# Patient Record
Sex: FEMALE | Race: WHITE | NOT HISPANIC OR LATINO | Employment: UNEMPLOYED | URBAN - NONMETROPOLITAN AREA
[De-identification: names, ages, dates, MRNs, and addresses within clinical notes are randomized per-mention and may not be internally consistent; named-entity substitution may affect disease eponyms.]

---

## 2017-02-16 ENCOUNTER — TRANSCRIBE ORDERS (OUTPATIENT)
Dept: ADMINISTRATIVE | Facility: HOSPITAL | Age: 52
End: 2017-02-16

## 2017-02-16 DIAGNOSIS — J01.91 ACUTE RECURRENT SINUSITIS, UNSPECIFIED LOCATION: Primary | ICD-10-CM

## 2017-03-22 ENCOUNTER — APPOINTMENT (OUTPATIENT)
Dept: CT IMAGING | Facility: HOSPITAL | Age: 52
End: 2017-03-22
Attending: OTOLARYNGOLOGY

## 2017-03-23 ENCOUNTER — OFFICE VISIT (OUTPATIENT)
Dept: PULMONOLOGY | Facility: CLINIC | Age: 52
End: 2017-03-23

## 2017-03-23 VITALS
DIASTOLIC BLOOD PRESSURE: 82 MMHG | SYSTOLIC BLOOD PRESSURE: 118 MMHG | HEART RATE: 96 BPM | OXYGEN SATURATION: 92 % | BODY MASS INDEX: 31.41 KG/M2 | WEIGHT: 184 LBS | TEMPERATURE: 98.2 F | HEIGHT: 64 IN

## 2017-03-23 DIAGNOSIS — J41.0 SIMPLE CHRONIC BRONCHITIS (HCC): Primary | ICD-10-CM

## 2017-03-23 DIAGNOSIS — J30.9 CHRONIC ALLERGIC RHINITIS: ICD-10-CM

## 2017-03-23 DIAGNOSIS — E66.9 OBESITY (BMI 30-39.9): ICD-10-CM

## 2017-03-23 PROCEDURE — 99204 OFFICE O/P NEW MOD 45 MIN: CPT | Performed by: INTERNAL MEDICINE

## 2017-03-23 RX ORDER — GUAIFENESIN AND DEXTROMETHORPHAN HYDROBROMIDE 600; 30 MG/1; MG/1
TABLET, EXTENDED RELEASE ORAL
Refills: 0 | COMMUNITY
Start: 2017-02-09 | End: 2017-09-27 | Stop reason: ALTCHOICE

## 2017-03-23 RX ORDER — LORATADINE 10 MG/1
10 TABLET ORAL DAILY
Refills: 2 | COMMUNITY
Start: 2017-03-07

## 2017-03-23 RX ORDER — HYDROCHLOROTHIAZIDE 12.5 MG/1
12.5 TABLET ORAL EVERY OTHER DAY
Refills: 1 | COMMUNITY
Start: 2017-03-07

## 2017-03-23 RX ORDER — ERGOCALCIFEROL 1.25 MG/1
50000 CAPSULE ORAL
Refills: 3 | COMMUNITY
Start: 2017-03-07

## 2017-03-23 RX ORDER — PANTOPRAZOLE SODIUM 40 MG/1
40 TABLET, DELAYED RELEASE ORAL DAILY
Refills: 1 | COMMUNITY
Start: 2017-03-07

## 2017-03-23 RX ORDER — DOXYCYCLINE 100 MG/1
CAPSULE ORAL
Refills: 0 | COMMUNITY
Start: 2017-03-17 | End: 2017-09-27 | Stop reason: ALTCHOICE

## 2017-03-23 RX ORDER — LISINOPRIL 10 MG/1
10 TABLET ORAL DAILY
Refills: 1 | COMMUNITY
Start: 2017-03-07

## 2017-03-23 RX ORDER — LEVOFLOXACIN 750 MG/1
TABLET ORAL
Refills: 0 | COMMUNITY
Start: 2017-02-09 | End: 2017-09-27 | Stop reason: ALTCHOICE

## 2017-03-23 RX ORDER — OSELTAMIVIR PHOSPHATE 75 MG/1
CAPSULE ORAL
Refills: 0 | COMMUNITY
Start: 2017-03-17 | End: 2017-09-27 | Stop reason: ALTCHOICE

## 2017-03-23 RX ORDER — FLUTICASONE PROPIONATE 50 MCG
2 SPRAY, SUSPENSION (ML) NASAL AS NEEDED
Refills: 2 | COMMUNITY
Start: 2017-02-08

## 2017-03-23 RX ORDER — GABAPENTIN 300 MG/1
CAPSULE ORAL
Refills: 0 | COMMUNITY
Start: 2017-03-08 | End: 2017-09-27 | Stop reason: ALTCHOICE

## 2017-03-23 RX ORDER — FLUCONAZOLE 150 MG/1
TABLET ORAL
Refills: 2 | COMMUNITY
Start: 2016-12-20 | End: 2017-09-27 | Stop reason: ALTCHOICE

## 2017-03-23 RX ORDER — IBUPROFEN 800 MG/1
TABLET ORAL
Refills: 1 | Status: ON HOLD | COMMUNITY
Start: 2017-03-07 | End: 2018-08-01

## 2017-03-23 RX ORDER — RALOXIFENE HYDROCHLORIDE 60 MG/1
60 TABLET, FILM COATED ORAL DAILY
Refills: 1 | COMMUNITY
Start: 2017-03-07

## 2017-03-23 NOTE — PROGRESS NOTES
Subjective   Mei Stinson is a 51 y.o. female who is being seen for Asthma    History of Present Illness   This is a 51-year-old female who presents today for evaluation of ongoing shortness of breath.  Patient tells me that she has history of smoking and had cough for life which she had to be due to smoking.  She did quit smoking roughly a year ago but noticed that her cough is not going away.  She is also having progressively increasing shortness of breath.  She uses albuterol inhaler on a when necessary basis.    Patient also tells us that she has chronic allergy for which she is on medication.  Lately her allergy symptoms has increased significantly and now she is going to an allergist, waiting to have an allergy test.    Past Medical History:   Diagnosis Date   • Chronic bronchitis    • COPD (chronic obstructive pulmonary disease)    • Diabetes mellitus    • Fibromyalgia    • Hypertension      Past Surgical History:   Procedure Laterality Date   • BLADDER SURGERY     • CHOLECYSTECTOMY     • FOOT SURGERY     • HYSTERECTOMY     • TONSILLECTOMY     • TUBAL ABDOMINAL LIGATION       Family History   Problem Relation Age of Onset   • COPD Mother    • Emphysema Mother    • Heart failure Mother    • Alzheimer's disease Father    • Cancer Father    • Heart disease Sister       reports that she has quit smoking. Her smoking use included Cigarettes. She has never used smokeless tobacco. She reports that she does not drink alcohol or use illicit drugs.  Allergies   Allergen Reactions   • Sulfa Antibiotics            The following portions of the patient's history were reviewed and updated as appropriate: allergies, current medications, past family history, past medical history, past social history, past surgical history and problem list.    Review of Systems   Constitutional: Positive for fatigue. Negative for appetite change, chills, diaphoresis and unexpected weight change.   HENT: Positive for congestion, postnasal  "drip and sinus pressure. Negative for sore throat, trouble swallowing and voice change.    Eyes: Negative for visual disturbance.   Respiratory: Positive for cough, chest tightness, shortness of breath and wheezing. Negative for apnea and choking.    Cardiovascular: Negative for chest pain, palpitations and leg swelling.   Gastrointestinal: Negative for abdominal pain, constipation, diarrhea, nausea and vomiting.   Endocrine: Negative for cold intolerance, heat intolerance, polydipsia, polyphagia and polyuria.   Genitourinary: Negative for difficulty urinating and dysuria.   Musculoskeletal: Negative for gait problem.   Skin: Negative for rash and wound.   Allergic/Immunologic: Positive for environmental allergies.   Neurological: Negative for syncope and light-headedness.   Hematological: Negative for adenopathy.   Psychiatric/Behavioral: Positive for confusion. Negative for agitation and behavioral problems.   All other systems reviewed and are negative.      Objective   /82 (BP Location: Left arm, Patient Position: Sitting, Cuff Size: Adult)  Pulse 96  Temp 98.2 °F (36.8 °C) (Oral)   Ht 64\" (162.6 cm)  Wt 184 lb (83.5 kg)  SpO2 92%  BMI 31.58 kg/m2  Physical Exam   Constitutional: She is oriented to person, place, and time.   HENT:   Head: Normocephalic and atraumatic.   Nose: Mucosal edema present.   Eyes: EOM are normal. Pupils are equal, round, and reactive to light.   Neck: Neck supple.   Cardiovascular: Normal rate, regular rhythm and normal heart sounds.    Pulmonary/Chest: She has rhonchi.   Vesicular breath sound bilaterally with prolonged expiratory phase   Abdominal: Soft. Bowel sounds are normal.   Musculoskeletal: Normal range of motion. She exhibits no deformity.   Neurological: She is alert and oriented to person, place, and time.   Skin: Skin is warm and dry.   Psychiatric: She has a normal mood and affect. Her behavior is normal.   Nursing note and vitals " reviewed.        Radiology:  None available for my review today    Lab Results:  CBC  No results found for: WBC, RBC, HGB, HCT, MCV, MCH, MCHC, RDW, PLT, NEUTRORELPCT, LYMPHORELPCT, MONORELPCT, EOSRELPCT, EOSRELPCT, BASORELPCT, IMMATURECE, NEUTROABS, LYMPHSABS, MONOSABS, EOSABS, BASOSABS, IMMGRANABS, NRBC, LABHEMA    CMP  No results found for: NA, K, CL, CO2, GLUCOSE, BUN, CREATININE, CALCIUM, AST, ALKPHOS, BILITOT, ALT, LABIL2, LABOSMO, BCR, ANIONGAP, ALBUMIN, PROTEINTOT, EGFRCLEARA     Assessment      ICD-10-CM ICD-9-CM   1. Simple chronic bronchitis J41.0 491.0   2. Chronic allergic rhinitis J30.9 477.9   3. Obesity (BMI 30-39.9) E66.9 278.00                DISCUSSION:  This patient has symptoms consistent with a combination of allergic rhinitis and postnasal drip superimposed on COPD.    We are sending her for a pulmonary function test and a chest x-ray.  I like to reevaluate her again after that.  In the meantime I asked her to continue to follow with her allergist.    Plan    Orders Placed This Encounter   Procedures   • XR Chest PA & Lateral   • Pulmonary Function Test     No orders of the defined types were placed in this encounter.                 Adelso Marie MD, FCCP, FAASM  Pulmonary, Critical Care, and Sleep Medicine

## 2017-03-29 ENCOUNTER — HOSPITAL ENCOUNTER (OUTPATIENT)
Dept: CT IMAGING | Facility: HOSPITAL | Age: 52
Discharge: HOME | End: 2017-03-29
Attending: OTOLARYNGOLOGY | Admitting: OTOLARYNGOLOGY

## 2017-03-29 DIAGNOSIS — J01.91 ACUTE RECURRENT SINUSITIS, UNSPECIFIED LOCATION: ICD-10-CM

## 2017-03-29 PROCEDURE — 70486 CT MAXILLOFACIAL W/O DYE: CPT

## 2017-03-29 PROCEDURE — 70486 CT MAXILLOFACIAL W/O DYE: CPT | Performed by: RADIOLOGY

## 2017-05-04 ENCOUNTER — HOSPITAL ENCOUNTER (OUTPATIENT)
Dept: RESPIRATORY THERAPY | Facility: HOSPITAL | Age: 52
Discharge: HOME | End: 2017-05-04
Attending: INTERNAL MEDICINE | Admitting: INTERNAL MEDICINE

## 2017-05-04 ENCOUNTER — HOSPITAL ENCOUNTER (OUTPATIENT)
Dept: GENERAL RADIOLOGY | Facility: HOSPITAL | Age: 52
Discharge: HOME | End: 2017-05-04
Attending: INTERNAL MEDICINE

## 2017-05-04 DIAGNOSIS — J30.9 CHRONIC ALLERGIC RHINITIS: ICD-10-CM

## 2017-05-04 DIAGNOSIS — E66.9 OBESITY (BMI 30-39.9): ICD-10-CM

## 2017-05-04 DIAGNOSIS — J41.0 SIMPLE CHRONIC BRONCHITIS (HCC): ICD-10-CM

## 2017-05-04 PROCEDURE — 94060 EVALUATION OF WHEEZING: CPT

## 2017-05-04 PROCEDURE — 94727 GAS DIL/WSHOT DETER LNG VOL: CPT

## 2017-05-04 PROCEDURE — 71020 HC CHEST PA AND LATERAL: CPT

## 2017-05-04 PROCEDURE — 94729 DIFFUSING CAPACITY: CPT

## 2017-05-04 PROCEDURE — 94727 GAS DIL/WSHOT DETER LNG VOL: CPT | Performed by: INTERNAL MEDICINE

## 2017-05-04 PROCEDURE — 71020 XR CHEST PA AND LATERAL: CPT | Performed by: RADIOLOGY

## 2017-05-04 PROCEDURE — 94060 EVALUATION OF WHEEZING: CPT | Performed by: INTERNAL MEDICINE

## 2017-05-04 PROCEDURE — 94729 DIFFUSING CAPACITY: CPT | Performed by: INTERNAL MEDICINE

## 2017-06-07 ENCOUNTER — PREP FOR SURGERY (OUTPATIENT)
Dept: OTHER | Facility: HOSPITAL | Age: 52
End: 2017-06-07

## 2017-06-07 ENCOUNTER — DOCUMENTATION (OUTPATIENT)
Dept: OTOLARYNGOLOGY | Facility: HOSPITAL | Age: 52
End: 2017-06-07

## 2017-06-07 ENCOUNTER — HOSPITAL ENCOUNTER (OUTPATIENT)
Facility: HOSPITAL | Age: 52
Setting detail: HOSPITAL OUTPATIENT SURGERY
End: 2017-06-07
Attending: OTOLARYNGOLOGY | Admitting: OTOLARYNGOLOGY

## 2017-06-07 DIAGNOSIS — J30.1 ALLERGIC RHINITIS DUE TO POLLEN, UNSPECIFIED RHINITIS SEASONALITY: ICD-10-CM

## 2017-06-07 DIAGNOSIS — J32.0 CHRONIC MAXILLARY SINUSITIS: Primary | ICD-10-CM

## 2017-06-07 DIAGNOSIS — J34.2 NASAL SEPTAL DEVIATION: ICD-10-CM

## 2017-06-07 NOTE — H&P
Chief complaint: AST and CT results  HPI: AST-  (+) to all  CT- L & R maxillary sinus diease, septal deviation      Office Notes from 2/15/17 visits:  Sinus pain and pressure; headache in eye area; hearing loss; congestion; runny nose; > 6 rounds of ATB in 12 months for sinusitis; green discharge, facial pain, confusion, cherry tinnitus    Review of Systems:    negative    History  Past Medical History:   Diagnosis Date   • Chronic bronchitis    • COPD (chronic obstructive pulmonary disease)    • Diabetes mellitus    • Fibromyalgia    • Hypertension      Past Surgical History:   Procedure Laterality Date   • BLADDER SURGERY     • CHOLECYSTECTOMY     • FOOT SURGERY     • HYSTERECTOMY     • TONSILLECTOMY     • TUBAL ABDOMINAL LIGATION       Family History   Problem Relation Age of Onset   • COPD Mother    • Emphysema Mother    • Heart failure Mother    • Alzheimer's disease Father    • Cancer Father    • Heart disease Sister      Social History   Substance Use Topics   • Smoking status: Former Smoker     Types: Cigarettes   • Smokeless tobacco: Never Used   • Alcohol use No       (Not in a hospital admission)  Allergies:  Sulfa antibiotics    Objective     Vital Signs  Wt 190  Pulse 84  B/P 110/74    Physical Exam:      General Appearance:    Alert, cooperative, in no acute distress   Head:    Normocephalic, without obvious abnormality, atraumatic   Eyes:            Lids and lashes normal, conjunctivae and sclerae normal, no   icterus, no pallor, corneas clear, PERRLA   Ears:    Ears appear intact with no abnormalities noted   Nose:     Throat:   Nasal septum- deviated to the Lt; Turbinates- hypertrophied; Mucosa- normal    No oral lesions, no thrush, oral mucosa moist   Neck:   No adenopathy, supple, trachea midline, no thyromegaly, no   carotid bruit, no JVD    Thyroid- normal    Salivary Glands- normal       Lungs:     Clear to auscultation,respirations regular, even and                  unlabored    Heart:    Regular  rhythm and normal rate, normal S1 and S2, no            murmur, no gallop, no rub, no click                                                                  Assessment  Chronic Lt and Rt maxillary sinusitis  AR- due to pollen  Septal deviation    Plan    Septoplasty, Elbert Endo MMA's, Elbert ethmoids, Elbert avis bullosa           Alexander Whitfield MD  06/07/17  11:59 AM

## 2017-06-12 ENCOUNTER — APPOINTMENT (OUTPATIENT)
Dept: PREADMISSION TESTING | Facility: HOSPITAL | Age: 52
End: 2017-06-12

## 2017-09-27 ENCOUNTER — OFFICE VISIT (OUTPATIENT)
Dept: PULMONOLOGY | Facility: CLINIC | Age: 52
End: 2017-09-27

## 2017-09-27 VITALS
DIASTOLIC BLOOD PRESSURE: 86 MMHG | HEART RATE: 97 BPM | HEIGHT: 65 IN | WEIGHT: 198 LBS | BODY MASS INDEX: 32.99 KG/M2 | SYSTOLIC BLOOD PRESSURE: 156 MMHG | OXYGEN SATURATION: 95 % | TEMPERATURE: 98.2 F

## 2017-09-27 DIAGNOSIS — E66.9 OBESITY (BMI 30.0-34.9): ICD-10-CM

## 2017-09-27 DIAGNOSIS — J41.0 SIMPLE CHRONIC BRONCHITIS (HCC): Primary | ICD-10-CM

## 2017-09-27 PROCEDURE — 99214 OFFICE O/P EST MOD 30 MIN: CPT | Performed by: INTERNAL MEDICINE

## 2017-09-27 RX ORDER — LORATADINE, PSEUDOEPHEDRINE SULFATE 5; 120 MG/1; MG/1
TABLET, FILM COATED, EXTENDED RELEASE ORAL
Refills: 2 | COMMUNITY
Start: 2017-07-20 | End: 2017-09-27 | Stop reason: ALTCHOICE

## 2017-09-27 RX ORDER — CYCLOBENZAPRINE HCL 10 MG
10 TABLET ORAL 2 TIMES DAILY PRN
COMMUNITY
Start: 2017-09-13

## 2017-09-27 RX ORDER — INDOMETHACIN 50 MG/1
50 CAPSULE ORAL EVERY OTHER DAY
COMMUNITY
Start: 2017-09-01

## 2017-09-27 RX ORDER — LEVOFLOXACIN 500 MG/1
TABLET, FILM COATED ORAL
COMMUNITY
Start: 2017-08-17 | End: 2017-09-27 | Stop reason: ALTCHOICE

## 2017-09-27 RX ORDER — ALBUTEROL SULFATE 90 UG/1
2 AEROSOL, METERED RESPIRATORY (INHALATION) EVERY 4 HOURS PRN
Qty: 1 INHALER | Refills: 11 | Status: SHIPPED | OUTPATIENT
Start: 2017-09-27

## 2017-09-27 RX ORDER — GABAPENTIN 400 MG/1
600 CAPSULE ORAL 3 TIMES DAILY
COMMUNITY
Start: 2017-09-18

## 2017-09-27 RX ORDER — FAMOTIDINE 20 MG/1
20 TABLET, FILM COATED ORAL 2 TIMES DAILY PRN
COMMUNITY
Start: 2017-09-01

## 2017-09-27 RX ORDER — DIAZEPAM 2 MG/1
2 TABLET ORAL EVERY 12 HOURS PRN
COMMUNITY
Start: 2017-09-18

## 2017-09-27 RX ORDER — LINAGLIPTIN 5 MG/1
TABLET, FILM COATED ORAL
Status: ON HOLD | COMMUNITY
Start: 2017-09-12 | End: 2018-08-01

## 2017-09-27 RX ORDER — ALLOPURINOL 300 MG/1
300 TABLET ORAL NIGHTLY
COMMUNITY
Start: 2017-09-01

## 2017-09-27 NOTE — PROGRESS NOTES
Subjective   Mei Stinson is a 51 y.o. female who is being seen for Bronchitis    History of Present Illness   Patient returns after pulmonary function test.  This patient was admitted presents to us with complaints of shortness of breath.  She tells us that her shortness of breath is still persisting despite the fact that she is using albuterol inhaler.  Also has some dry cough.  Past Medical History:   Diagnosis Date   • Chronic bronchitis    • COPD (chronic obstructive pulmonary disease)    • Diabetes mellitus    • Fibromyalgia    • Hypertension      Past Surgical History:   Procedure Laterality Date   • BLADDER SURGERY     • CHOLECYSTECTOMY     • FOOT SURGERY     • HYSTERECTOMY     • TONSILLECTOMY     • TUBAL ABDOMINAL LIGATION       Family History   Problem Relation Age of Onset   • COPD Mother    • Emphysema Mother    • Heart failure Mother    • Alzheimer's disease Father    • Cancer Father    • Heart disease Sister       reports that she has quit smoking. Her smoking use included Cigarettes. She has never used smokeless tobacco. She reports that she does not drink alcohol or use illicit drugs.  Allergies   Allergen Reactions   • Paxil [Paroxetine Hcl]    • Sulfa Antibiotics          The following portions of the patient's history were reviewed and updated as appropriate: allergies, current medications, past family history, past medical history, past social history, past surgical history and problem list.    Review of Systems   Constitutional: Negative for appetite change, chills, diaphoresis and unexpected weight change.   HENT: Negative for sore throat, trouble swallowing and voice change.    Eyes: Negative for visual disturbance.   Respiratory: Positive for cough, shortness of breath and wheezing. Negative for apnea and choking.    Cardiovascular: Negative for chest pain, palpitations and leg swelling.   Gastrointestinal: Negative for abdominal pain, constipation, diarrhea, nausea and vomiting.  "  Endocrine: Negative for cold intolerance, heat intolerance, polydipsia, polyphagia and polyuria.   Genitourinary: Negative for difficulty urinating and dysuria.   Musculoskeletal: Negative for gait problem.   Skin: Negative for rash and wound.   Neurological: Negative for syncope and light-headedness.   Hematological: Negative for adenopathy.   Psychiatric/Behavioral: Negative for agitation, behavioral problems and confusion.   All other systems reviewed and are negative.      Objective   /86 (BP Location: Left arm, Patient Position: Sitting, Cuff Size: Adult)  Pulse 97  Temp 98.2 °F (36.8 °C) (Oral)   Ht 65\" (165.1 cm)  Wt 198 lb (89.8 kg)  SpO2 95%  BMI 32.95 kg/m2  Physical Exam   Constitutional: She is oriented to person, place, and time.   HENT:   Head: Normocephalic and atraumatic.   Nose: Mucosal edema present.   Eyes: EOM are normal. Pupils are equal, round, and reactive to light.   Neck: Neck supple.   Cardiovascular: Normal rate, regular rhythm and normal heart sounds.    Pulmonary/Chest: She has rhonchi.   Vesicular breath sound bilaterally with prolonged expiratory phase   Abdominal: Soft. Bowel sounds are normal.   Musculoskeletal: Normal range of motion. She exhibits no deformity.   Neurological: She is alert and oriented to person, place, and time.   Skin: Skin is warm and dry.   Psychiatric: She has a normal mood and affect. Her behavior is normal.   Nursing note and vitals reviewed.        Radiology:  No Images in the past 120 days found..    Lab Results:  No results found for any previous visit.    Assessment      ICD-10-CM ICD-9-CM   1. Simple chronic bronchitis J41.0 491.0   2. Obesity (BMI 30.0-34.9) E66.9 278.00                DISCUSSION:  I have reviewed the pulmonary function test.  Her FEV1 was reduced to 65% predicted and had no improvement after bronchodilator.  FEV1 to FVC ratio was reduced to 60.  Lung volumes were normal, diffusion capacity was low normal.  This findings " are consistent with mild to moderate COPD.    Currently patient is on albuterol inhaler only, I'm adding Breo 100 µg 1 puff daily.  We would reevaluate her again in approximately 3 months from now.    Plan    No orders of the defined types were placed in this encounter.    New Medications Ordered This Visit   Medications   • albuterol (PROAIR HFA) 108 (90 Base) MCG/ACT inhaler     Sig: Inhale 2 puffs Every 4 (Four) Hours As Needed for Shortness of Air.     Dispense:  1 inhaler     Refill:  11   • Fluticasone Furoate-Vilanterol (BREO ELLIPTA) 100-25 MCG/INH aerosol powder      Sig: Inhale 1 puff Daily.     Dispense:  1 each     Refill:  6                  Adelso Marie MD, FCCP, [facility]  Pulmonary, Critical Care, and Sleep Medicine

## 2017-10-25 ENCOUNTER — HOSPITAL ENCOUNTER (OUTPATIENT)
Dept: GENERAL RADIOLOGY | Facility: HOSPITAL | Age: 52
Discharge: HOME | End: 2017-10-25

## 2017-10-25 ENCOUNTER — TRANSCRIBE ORDERS (OUTPATIENT)
Dept: GENERAL RADIOLOGY | Facility: HOSPITAL | Age: 52
End: 2017-10-25

## 2017-10-25 ENCOUNTER — HOSPITAL ENCOUNTER (OUTPATIENT)
Dept: GENERAL RADIOLOGY | Facility: HOSPITAL | Age: 52
Discharge: HOME | End: 2017-10-25
Admitting: NURSE PRACTITIONER

## 2017-10-25 DIAGNOSIS — S69.92XA INJURY OF LEFT WRIST, INITIAL ENCOUNTER: ICD-10-CM

## 2017-10-25 DIAGNOSIS — S00.83XA CONTUSION OF FACE, INITIAL ENCOUNTER: ICD-10-CM

## 2017-10-25 DIAGNOSIS — S69.92XA INJURY OF LEFT WRIST, INITIAL ENCOUNTER: Primary | ICD-10-CM

## 2017-10-25 PROCEDURE — 73130 X-RAY EXAM OF HAND: CPT

## 2017-10-25 PROCEDURE — 73130 X-RAY EXAM OF HAND: CPT | Performed by: RADIOLOGY

## 2017-10-25 PROCEDURE — 70150 X-RAY EXAM OF FACIAL BONES: CPT | Performed by: RADIOLOGY

## 2017-10-25 PROCEDURE — 70150 X-RAY EXAM OF FACIAL BONES: CPT

## 2017-10-27 ENCOUNTER — OFFICE VISIT (OUTPATIENT)
Dept: ORTHOPEDIC SURGERY | Facility: CLINIC | Age: 52
End: 2017-10-27

## 2017-10-27 VITALS — HEIGHT: 65 IN | BODY MASS INDEX: 31.99 KG/M2 | WEIGHT: 192 LBS

## 2017-10-27 DIAGNOSIS — S62.367A CLOSED NONDISPLACED FRACTURE OF NECK OF FIFTH METACARPAL BONE OF LEFT HAND, INITIAL ENCOUNTER: Primary | ICD-10-CM

## 2017-10-27 PROCEDURE — 26600 TREAT METACARPAL FRACTURE: CPT | Performed by: PHYSICIAN ASSISTANT

## 2017-10-27 RX ORDER — HYDROCODONE BITARTRATE AND ACETAMINOPHEN 7.5; 325 MG/1; MG/1
TABLET ORAL
Status: ON HOLD | COMMUNITY
Start: 2017-10-24 | End: 2018-08-01

## 2017-10-27 RX ORDER — BUPROPION HYDROCHLORIDE 150 MG/1
300 TABLET ORAL
Status: ON HOLD | COMMUNITY
Start: 2017-10-17 | End: 2018-08-01

## 2017-10-27 RX ORDER — DICYCLOMINE HYDROCHLORIDE 10 MG/1
CAPSULE ORAL
COMMUNITY
Start: 2015-06-30

## 2017-10-27 RX ORDER — CEFDINIR 300 MG/1
CAPSULE ORAL
Status: ON HOLD | COMMUNITY
Start: 2017-10-17 | End: 2018-08-01
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
## 2017-11-02 DIAGNOSIS — M79.642 LEFT HAND PAIN: Primary | ICD-10-CM

## 2017-11-03 ENCOUNTER — OFFICE VISIT (OUTPATIENT)
Dept: ORTHOPEDIC SURGERY | Facility: CLINIC | Age: 52
End: 2017-11-03

## 2017-11-03 ENCOUNTER — HOSPITAL ENCOUNTER (OUTPATIENT)
Dept: GENERAL RADIOLOGY | Facility: HOSPITAL | Age: 52
Discharge: HOME | End: 2017-11-03
Admitting: PHYSICIAN ASSISTANT

## 2017-11-03 VITALS — HEIGHT: 65 IN | WEIGHT: 192 LBS | BODY MASS INDEX: 31.99 KG/M2

## 2017-11-03 DIAGNOSIS — S62.367D CLOSED NONDISPLACED FRACTURE OF NECK OF FIFTH METACARPAL BONE OF LEFT HAND WITH ROUTINE HEALING, SUBSEQUENT ENCOUNTER: Primary | ICD-10-CM

## 2017-11-03 PROCEDURE — 73130 X-RAY EXAM OF HAND: CPT

## 2017-11-03 PROCEDURE — 99024 POSTOP FOLLOW-UP VISIT: CPT | Performed by: PHYSICIAN ASSISTANT

## 2017-11-03 PROCEDURE — 73130 X-RAY EXAM OF HAND: CPT | Performed by: RADIOLOGY

## 2017-11-03 PROCEDURE — 29075 APPL CST ELBW FNGR SHORT ARM: CPT | Performed by: PHYSICIAN ASSISTANT

## 2017-11-03 NOTE — PROGRESS NOTES
Patient: Mei Stinson  YOB: 1965  Date of Encounter: 11/03/2017        History of Present Illness:     Mei Stinson is a 52 y.o. female seen today 1 week follow-up secondary to left hand fifth metacarpal neck fracture date of injury 10/21/17.  Patient states that the pain into the fifth digit seems to be doing okay however she is began to develop numbness in a twinge with movement of her thumb while in the cast.  This started approximately 24 hours after the cast was applied and has not improved.  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  She requests removal of cast or any other conservative options.    Social History     Occupational History   • Not on file.     Social History Main Topics   • Smoking status: Former Smoker     Types: Cigarettes   • Smokeless tobacco: Never Used   • Alcohol use No   • Drug use: No   • Sexual activity: Defer       Physical Exam: 52 y.o. female .  General Appearance:    Well appearing, cooperative, in no acute distress.       Patient is alert and oriented x 3.          Musculoskeletal: Examination today of the patient's left hand reveals a boxer cast immobilizing the fourth and fifth digits intact.  The patient did have good space with movement of her thumb while in the cast however she had altered sensation to light palpation.  Cast was removed thus alleviating the twinging pain and improvement in the numbness to the pad of the thumb.      Radiology:       XR: AP, lateral, oblique x-rays left hand reveals no significant change in alignment or positioning of the fifth metacarpal neck fracture with slight impaction and volar angulation.  No evidence of any bony callus consolidation or bony healing     Assessment:    ICD-10-CM ICD-9-CM   1. Closed nondisplaced fracture of neck of fifth metacarpal bone of left hand with  routine healing, subsequent encounter S62.367D V54.19       Plan:    Patient was recasted today with a short arm fiberglass boxer's cast.  She was instructed to return back in 5 weeks for repeat x-rays out of cast and further evaluation.  Once again the patient was informed that there is impaction and volar angulation of this fracture that would lead to loss of prominence of the MCP and potentially minimal change in full extension of the fifth digit and she was okay with continued conservative treatment with this. Patient was provided with a note that the patient may continue to work light duty limited use of left hand as well as secondary to the immobilization of his hand she may require scribe if available.        This document was signed by Howie Lauren PA-C November 3, 2017       CC: SRI Kang

## 2017-12-07 DIAGNOSIS — M79.642 LEFT HAND PAIN: Primary | ICD-10-CM

## 2017-12-08 ENCOUNTER — HOSPITAL ENCOUNTER (OUTPATIENT)
Dept: GENERAL RADIOLOGY | Facility: HOSPITAL | Age: 52
Discharge: HOME | End: 2017-12-08
Admitting: PHYSICIAN ASSISTANT

## 2017-12-08 ENCOUNTER — OFFICE VISIT (OUTPATIENT)
Dept: ORTHOPEDIC SURGERY | Facility: CLINIC | Age: 52
End: 2017-12-08

## 2017-12-08 VITALS — HEIGHT: 65 IN

## 2017-12-08 DIAGNOSIS — M79.642 LEFT HAND PAIN: ICD-10-CM

## 2017-12-08 DIAGNOSIS — S62.367D CLOSED NONDISPLACED FRACTURE OF NECK OF FIFTH METACARPAL BONE OF LEFT HAND WITH ROUTINE HEALING, SUBSEQUENT ENCOUNTER: Primary | ICD-10-CM

## 2017-12-08 PROCEDURE — 73130 X-RAY EXAM OF HAND: CPT

## 2017-12-08 PROCEDURE — 99024 POSTOP FOLLOW-UP VISIT: CPT | Performed by: PHYSICIAN ASSISTANT

## 2017-12-08 PROCEDURE — 73130 X-RAY EXAM OF HAND: CPT | Performed by: RADIOLOGY

## 2017-12-08 RX ORDER — BUPROPION HYDROCHLORIDE 300 MG/1
TABLET ORAL
Status: ON HOLD | COMMUNITY
Start: 2017-12-02 | End: 2018-08-01

## 2017-12-08 RX ORDER — LEVOFLOXACIN 750 MG/1
TABLET ORAL
Status: ON HOLD | COMMUNITY
Start: 2017-12-02 | End: 2018-08-01

## 2017-12-08 RX ORDER — MECLIZINE HCL 25 MG/1
TABLET, CHEWABLE ORAL
Status: ON HOLD | COMMUNITY
Start: 2017-11-13 | End: 2018-08-01

## 2017-12-08 RX ORDER — GLIPIZIDE 10 MG/1
10 TABLET, FILM COATED, EXTENDED RELEASE ORAL DAILY
COMMUNITY
Start: 2017-12-02

## 2017-12-08 NOTE — PROGRESS NOTES
Patient: Mei Stinson  YOB: 1965  Date of Encounter: 12/08/2017        History of Present Illness:     Mei Stinson is a 52 y.o. female seen today for repeat x-rays out of cast and further evaluation.  Once more patient suffered fifth metacarpal neck fracture date of injury 10/21/17.  Overall she states that with the cast medication she seemed to do well without any complications.  She reports soreness upon coming out of the cast today.  No other new complaints.  Denies any paresthesias.    Social History     Occupational History   • Not on file.     Social History Main Topics   • Smoking status: Former Smoker     Types: Cigarettes   • Smokeless tobacco: Never Used   • Alcohol use No   • Drug use: No   • Sexual activity: Defer       Physical Exam: 52 y.o. female .  General Appearance:    Well appearing, cooperative, in no acute distress.       Patient is alert and oriented x 3.          Musculoskeletal: Examination of the patient's left hand reveals limited range of motion secondary cast syndrome.  She does have full passive range of motion with mild pain.  No rotational abnormalities.  Decreasing swelling and no noted ecchymosis or erythema.  Neurovascular status grossly intact.      Radiology:       XR: Left hand 3 views reveals stable alignment fifth metacarpal fracture minimal callus demonstrated.  No significant change in alignment or positioning with fracture.      Assessment:    ICD-10-CM ICD-9-CM   1. Closed nondisplaced fracture of neck of fifth metacarpal bone of left hand with routine healing, subsequent encounter S62.367D V54.19       Plan:    Situation was discussed with the patient and at this point she may progress and her normal activities.  She was asked to refrain from pushing herself up from a seated position using the hand or any heavy lifting or manual labor.  We will see the patient back in 2 weeks for repeat x-rays as there was only minimal callus formation noted on  radiographs today.  There is not been significant change in alignment positioning to suggest absence of consolidation.  Repeat x-rays in 2 weeks for further evaluation of this.  Patient may continue to implement anti-lateral medication for pain and swelling.    This document was signed by Howie Lauren PA-C December 8, 2017       CC: SRI Kang

## 2017-12-27 DIAGNOSIS — M79.642 LEFT HAND PAIN: Primary | ICD-10-CM

## 2017-12-28 ENCOUNTER — OFFICE VISIT (OUTPATIENT)
Dept: ORTHOPEDIC SURGERY | Facility: CLINIC | Age: 52
End: 2017-12-28

## 2017-12-28 ENCOUNTER — HOSPITAL ENCOUNTER (OUTPATIENT)
Dept: GENERAL RADIOLOGY | Facility: HOSPITAL | Age: 52
Discharge: HOME | End: 2017-12-28
Admitting: PHYSICIAN ASSISTANT

## 2017-12-28 VITALS — HEIGHT: 65 IN

## 2017-12-28 DIAGNOSIS — M79.642 LEFT HAND PAIN: ICD-10-CM

## 2017-12-28 DIAGNOSIS — S62.367D CLOSED NONDISPLACED FRACTURE OF NECK OF FIFTH METACARPAL BONE OF LEFT HAND WITH ROUTINE HEALING, SUBSEQUENT ENCOUNTER: Primary | ICD-10-CM

## 2017-12-28 PROCEDURE — 73130 X-RAY EXAM OF HAND: CPT

## 2017-12-28 PROCEDURE — 73130 X-RAY EXAM OF HAND: CPT | Performed by: RADIOLOGY

## 2017-12-28 PROCEDURE — 99024 POSTOP FOLLOW-UP VISIT: CPT | Performed by: PHYSICIAN ASSISTANT

## 2017-12-28 NOTE — PROGRESS NOTES
Patient: Mei Stinson  YOB: 1965  Date of Encounter: 12/28/2017        History of Present Illness:     Mei Stinson is a 52 y.o. female seen today for repeat x-rays and further evaluation.  Once more patient's initial date of injury fifth metacarpal neck 10/21/17.  She reports improving range of motion with occasional soreness and pain upon typing.  No other new complaints.  Denies any paresthesias    Social History     Occupational History   • Not on file.     Social History Main Topics   • Smoking status: Former Smoker     Types: Cigarettes   • Smokeless tobacco: Never Used   • Alcohol use No   • Drug use: No   • Sexual activity: Defer       Physical Exam: 52 y.o. female .  General Appearance:    Well appearing, cooperative, in no acute distress.       Patient is alert and oriented x 3.          Musculoskeletal: left hand exam today reveals no significant changes. No rotational deformities or abnormalities. Full passive range of motion. Full active flexion of the MCP. NV status grossly intact.       Radiology:       Stable appearance angulated nondisplaced fifth metacarpal neck fracture with early callus consolidation.       Assessment:    ICD-10-CM ICD-9-CM   1. Closed nondisplaced fracture of neck of fifth metacarpal bone of left hand with routine healing, subsequent encounter S62.367D V54.19       Plan:    Discussed the situation with the patient and would like to see further callus formation and bony consolidation before releasing to full activity without restrictions.  Patient will return back in 2 weeks for repeat x-rays and further evaluation.  She is to continue to implement range of motion exercises as tolerated.  NSAIDs as pain and swelling dictate.    This document was signed by Howie Lauren PA-C December 28, 2017       CC: SRI Kang

## 2018-01-19 DIAGNOSIS — M79.642 LEFT HAND PAIN: Primary | ICD-10-CM

## 2018-01-22 ENCOUNTER — APPOINTMENT (OUTPATIENT)
Dept: GENERAL RADIOLOGY | Facility: HOSPITAL | Age: 53
End: 2018-01-22

## 2018-02-16 ENCOUNTER — TRANSCRIBE ORDERS (OUTPATIENT)
Dept: ADMINISTRATIVE | Facility: HOSPITAL | Age: 53
End: 2018-02-16

## 2018-02-16 DIAGNOSIS — R41.3 MEMORY IMPAIRMENT: Primary | ICD-10-CM

## 2018-02-23 ENCOUNTER — APPOINTMENT (OUTPATIENT)
Dept: MRI IMAGING | Facility: HOSPITAL | Age: 53
End: 2018-02-23
Attending: PSYCHIATRY & NEUROLOGY

## 2018-02-26 ENCOUNTER — HOSPITAL ENCOUNTER (OUTPATIENT)
Dept: MRI IMAGING | Facility: HOSPITAL | Age: 53
Discharge: HOME | End: 2018-02-26
Attending: PSYCHIATRY & NEUROLOGY | Admitting: PSYCHIATRY & NEUROLOGY

## 2018-02-26 DIAGNOSIS — R41.3 MEMORY IMPAIRMENT: ICD-10-CM

## 2018-02-26 PROCEDURE — 70551 MRI BRAIN STEM W/O DYE: CPT

## 2018-02-26 PROCEDURE — 70551 MRI BRAIN STEM W/O DYE: CPT | Performed by: RADIOLOGY

## 2018-05-09 ENCOUNTER — TRANSCRIBE ORDERS (OUTPATIENT)
Dept: ADMINISTRATIVE | Facility: HOSPITAL | Age: 53
End: 2018-05-09

## 2018-05-09 DIAGNOSIS — J32.0 CHRONIC MAXILLARY SINUSITIS: Primary | ICD-10-CM

## 2018-05-30 ENCOUNTER — HOSPITAL ENCOUNTER (OUTPATIENT)
Dept: CT IMAGING | Facility: HOSPITAL | Age: 53
Discharge: HOME | End: 2018-05-30
Attending: OTOLARYNGOLOGY | Admitting: OTOLARYNGOLOGY

## 2018-05-30 DIAGNOSIS — J32.0 CHRONIC MAXILLARY SINUSITIS: ICD-10-CM

## 2018-05-30 PROCEDURE — 70486 CT MAXILLOFACIAL W/O DYE: CPT | Performed by: RADIOLOGY

## 2018-05-30 PROCEDURE — 70486 CT MAXILLOFACIAL W/O DYE: CPT

## 2018-06-12 PROBLEM — J01.01 ACUTE RECURRENT MAXILLARY SINUSITIS: Status: ACTIVE | Noted: 2018-06-12

## 2018-06-12 PROBLEM — J34.2 NASAL SEPTAL DEVIATION: Status: ACTIVE | Noted: 2018-06-12

## 2018-06-12 PROBLEM — J34.3 HYPERTROPHY OF INFERIOR NASAL TURBINATE: Status: ACTIVE | Noted: 2018-06-12

## 2018-06-12 PROBLEM — J01.21 ACUTE RECURRENT ETHMOIDAL SINUSITIS: Status: ACTIVE | Noted: 2018-06-12

## 2018-06-22 ENCOUNTER — APPOINTMENT (OUTPATIENT)
Dept: PREADMISSION TESTING | Facility: HOSPITAL | Age: 53
End: 2018-06-22

## 2018-07-30 ENCOUNTER — APPOINTMENT (OUTPATIENT)
Dept: PREADMISSION TESTING | Facility: HOSPITAL | Age: 53
End: 2018-07-30

## 2018-08-01 ENCOUNTER — ANESTHESIA EVENT (OUTPATIENT)
Dept: PERIOP | Facility: HOSPITAL | Age: 53
End: 2018-08-01

## 2018-08-01 ENCOUNTER — ANESTHESIA (OUTPATIENT)
Dept: PERIOP | Facility: HOSPITAL | Age: 53
End: 2018-08-01

## 2018-08-01 ENCOUNTER — APPOINTMENT (OUTPATIENT)
Dept: PREADMISSION TESTING | Facility: HOSPITAL | Age: 53
End: 2018-08-01

## 2018-08-01 ENCOUNTER — HOSPITAL ENCOUNTER (OUTPATIENT)
Facility: HOSPITAL | Age: 53
Setting detail: HOSPITAL OUTPATIENT SURGERY
Discharge: HOME | End: 2018-08-01
Attending: OTOLARYNGOLOGY | Admitting: ANESTHESIOLOGY

## 2018-08-01 VITALS
RESPIRATION RATE: 18 BRPM | HEART RATE: 91 BPM | TEMPERATURE: 98 F | DIASTOLIC BLOOD PRESSURE: 88 MMHG | HEIGHT: 64 IN | WEIGHT: 187 LBS | SYSTOLIC BLOOD PRESSURE: 168 MMHG | BODY MASS INDEX: 31.92 KG/M2 | OXYGEN SATURATION: 97 %

## 2018-08-01 LAB
ANION GAP SERPL CALCULATED.3IONS-SCNC: 5.2 MMOL/L (ref 3.6–11.2)
BUN BLD-MCNC: 13 MG/DL (ref 7–21)
BUN/CREAT SERPL: 18.8 (ref 7–25)
CALCIUM SPEC-SCNC: 8.7 MG/DL (ref 7.7–10)
CHLORIDE SERPL-SCNC: 110 MMOL/L (ref 99–112)
CO2 SERPL-SCNC: 25.8 MMOL/L (ref 24.3–31.9)
CREAT BLD-MCNC: 0.69 MG/DL (ref 0.43–1.29)
DEPRECATED RDW RBC AUTO: 42.9 FL (ref 37–54)
ERYTHROCYTE [DISTWIDTH] IN BLOOD BY AUTOMATED COUNT: 13.1 % (ref 11.5–14.5)
GFR SERPL CREATININE-BSD FRML MDRD: 89 ML/MIN/1.73
GLUCOSE BLD-MCNC: 162 MG/DL (ref 70–110)
HCT VFR BLD AUTO: 39.5 % (ref 37–47)
HGB BLD-MCNC: 13.1 G/DL (ref 12–16)
MCH RBC QN AUTO: 30.7 PG (ref 27–33)
MCHC RBC AUTO-ENTMCNC: 33.2 G/DL (ref 33–37)
MCV RBC AUTO: 92.5 FL (ref 80–94)
OSMOLALITY SERPL CALC.SUM OF ELEC: 284.9 MOSM/KG (ref 273–305)
PLATELET # BLD AUTO: 294 10*3/MM3 (ref 130–400)
PMV BLD AUTO: 10.5 FL (ref 6–10)
POTASSIUM BLD-SCNC: 4.1 MMOL/L (ref 3.5–5.3)
RBC # BLD AUTO: 4.27 10*6/MM3 (ref 4.2–5.4)
SODIUM BLD-SCNC: 141 MMOL/L (ref 135–153)
WBC NRBC COR # BLD: 7.88 10*3/MM3 (ref 4.5–12.5)

## 2018-08-01 PROCEDURE — 93005 ELECTROCARDIOGRAM TRACING: CPT

## 2018-08-01 PROCEDURE — 80048 BASIC METABOLIC PNL TOTAL CA: CPT | Performed by: OTOLARYNGOLOGY

## 2018-08-01 PROCEDURE — 25010000002 FENTANYL CITRATE (PF) 100 MCG/2ML SOLUTION: Performed by: NURSE ANESTHETIST, CERTIFIED REGISTERED

## 2018-08-01 PROCEDURE — 25010000002 ONDANSETRON PER 1 MG: Performed by: NURSE ANESTHETIST, CERTIFIED REGISTERED

## 2018-08-01 PROCEDURE — 36415 COLL VENOUS BLD VENIPUNCTURE: CPT

## 2018-08-01 PROCEDURE — 25010000002 DEXAMETHASONE PER 1 MG: Performed by: NURSE ANESTHETIST, CERTIFIED REGISTERED

## 2018-08-01 PROCEDURE — 25010000002 MIDAZOLAM PER 1 MG: Performed by: ANESTHESIOLOGY

## 2018-08-01 PROCEDURE — 85027 COMPLETE CBC AUTOMATED: CPT | Performed by: OTOLARYNGOLOGY

## 2018-08-01 PROCEDURE — 93010 ELECTROCARDIOGRAM REPORT: CPT | Performed by: INTERNAL MEDICINE

## 2018-08-01 PROCEDURE — 25010000002 PROPOFOL 10 MG/ML EMULSION: Performed by: NURSE ANESTHETIST, CERTIFIED REGISTERED

## 2018-08-01 RX ORDER — BACITRACIN, NEOMYCIN, POLYMYXIN B 400; 3.5; 5 [USP'U]/G; MG/G; [USP'U]/G
OINTMENT TOPICAL AS NEEDED
Status: DISCONTINUED | OUTPATIENT
Start: 2018-08-01 | End: 2018-08-01 | Stop reason: HOSPADM

## 2018-08-01 RX ORDER — FENTANYL CITRATE 50 UG/ML
INJECTION, SOLUTION INTRAMUSCULAR; INTRAVENOUS AS NEEDED
Status: DISCONTINUED | OUTPATIENT
Start: 2018-08-01 | End: 2018-08-01 | Stop reason: SURG

## 2018-08-01 RX ORDER — DEXAMETHASONE SODIUM PHOSPHATE 10 MG/ML
INJECTION INTRAMUSCULAR; INTRAVENOUS AS NEEDED
Status: DISCONTINUED | OUTPATIENT
Start: 2018-08-01 | End: 2018-08-01 | Stop reason: SURG

## 2018-08-01 RX ORDER — OXYCODONE HYDROCHLORIDE AND ACETAMINOPHEN 5; 325 MG/1; MG/1
1 TABLET ORAL ONCE AS NEEDED
Status: DISCONTINUED | OUTPATIENT
Start: 2018-08-01 | End: 2018-08-01 | Stop reason: HOSPADM

## 2018-08-01 RX ORDER — ONDANSETRON 2 MG/ML
4 INJECTION INTRAMUSCULAR; INTRAVENOUS ONCE AS NEEDED
Status: DISCONTINUED | OUTPATIENT
Start: 2018-08-01 | End: 2018-08-01 | Stop reason: HOSPADM

## 2018-08-01 RX ORDER — IPRATROPIUM BROMIDE AND ALBUTEROL SULFATE 2.5; .5 MG/3ML; MG/3ML
3 SOLUTION RESPIRATORY (INHALATION) ONCE AS NEEDED
Status: DISCONTINUED | OUTPATIENT
Start: 2018-08-01 | End: 2018-08-01 | Stop reason: HOSPADM

## 2018-08-01 RX ORDER — ESMOLOL HYDROCHLORIDE 10 MG/ML
INJECTION INTRAVENOUS AS NEEDED
Status: DISCONTINUED | OUTPATIENT
Start: 2018-08-01 | End: 2018-08-01 | Stop reason: SURG

## 2018-08-01 RX ORDER — SODIUM CHLORIDE 0.9 % (FLUSH) 0.9 %
1-10 SYRINGE (ML) INJECTION AS NEEDED
Status: DISCONTINUED | OUTPATIENT
Start: 2018-08-01 | End: 2018-08-01 | Stop reason: HOSPADM

## 2018-08-01 RX ORDER — MIDAZOLAM HYDROCHLORIDE 1 MG/ML
2 INJECTION INTRAMUSCULAR; INTRAVENOUS
Status: DISCONTINUED | OUTPATIENT
Start: 2018-08-01 | End: 2018-08-01 | Stop reason: HOSPADM

## 2018-08-01 RX ORDER — MAGNESIUM HYDROXIDE 1200 MG/15ML
LIQUID ORAL AS NEEDED
Status: DISCONTINUED | OUTPATIENT
Start: 2018-08-01 | End: 2018-08-01 | Stop reason: HOSPADM

## 2018-08-01 RX ORDER — HYDROCODONE BITARTRATE AND ACETAMINOPHEN 7.5; 325 MG/1; MG/1
1 TABLET ORAL EVERY 6 HOURS PRN
Qty: 30 TABLET | Refills: 0 | Status: SHIPPED | OUTPATIENT
Start: 2018-08-01

## 2018-08-01 RX ORDER — FAMOTIDINE 10 MG/ML
INJECTION, SOLUTION INTRAVENOUS AS NEEDED
Status: DISCONTINUED | OUTPATIENT
Start: 2018-08-01 | End: 2018-08-01 | Stop reason: SURG

## 2018-08-01 RX ORDER — PROPOFOL 10 MG/ML
VIAL (ML) INTRAVENOUS AS NEEDED
Status: DISCONTINUED | OUTPATIENT
Start: 2018-08-01 | End: 2018-08-01 | Stop reason: SURG

## 2018-08-01 RX ORDER — LIDOCAINE HYDROCHLORIDE 20 MG/ML
INJECTION, SOLUTION INFILTRATION; PERINEURAL AS NEEDED
Status: DISCONTINUED | OUTPATIENT
Start: 2018-08-01 | End: 2018-08-01 | Stop reason: SURG

## 2018-08-01 RX ORDER — LIDOCAINE HYDROCHLORIDE AND EPINEPHRINE 10; 10 MG/ML; UG/ML
INJECTION, SOLUTION INFILTRATION; PERINEURAL AS NEEDED
Status: DISCONTINUED | OUTPATIENT
Start: 2018-08-01 | End: 2018-08-01 | Stop reason: HOSPADM

## 2018-08-01 RX ORDER — SODIUM CHLORIDE, SODIUM LACTATE, POTASSIUM CHLORIDE, CALCIUM CHLORIDE 600; 310; 30; 20 MG/100ML; MG/100ML; MG/100ML; MG/100ML
125 INJECTION, SOLUTION INTRAVENOUS CONTINUOUS
Status: DISCONTINUED | OUTPATIENT
Start: 2018-08-01 | End: 2018-08-01 | Stop reason: HOSPADM

## 2018-08-01 RX ORDER — METOPROLOL TARTRATE 5 MG/5ML
INJECTION INTRAVENOUS AS NEEDED
Status: DISCONTINUED | OUTPATIENT
Start: 2018-08-01 | End: 2018-08-01 | Stop reason: SURG

## 2018-08-01 RX ORDER — MIDAZOLAM HYDROCHLORIDE 1 MG/ML
1 INJECTION INTRAMUSCULAR; INTRAVENOUS
Status: DISCONTINUED | OUTPATIENT
Start: 2018-08-01 | End: 2018-08-01 | Stop reason: HOSPADM

## 2018-08-01 RX ORDER — MEPERIDINE HYDROCHLORIDE 50 MG/ML
12.5 INJECTION INTRAMUSCULAR; INTRAVENOUS; SUBCUTANEOUS
Status: DISCONTINUED | OUTPATIENT
Start: 2018-08-01 | End: 2018-08-01 | Stop reason: HOSPADM

## 2018-08-01 RX ORDER — ONDANSETRON 2 MG/ML
INJECTION INTRAMUSCULAR; INTRAVENOUS AS NEEDED
Status: DISCONTINUED | OUTPATIENT
Start: 2018-08-01 | End: 2018-08-01 | Stop reason: SURG

## 2018-08-01 RX ORDER — ROCURONIUM BROMIDE 10 MG/ML
INJECTION, SOLUTION INTRAVENOUS AS NEEDED
Status: DISCONTINUED | OUTPATIENT
Start: 2018-08-01 | End: 2018-08-01 | Stop reason: SURG

## 2018-08-01 RX ORDER — ONDANSETRON 4 MG/1
4 TABLET, FILM COATED ORAL EVERY 8 HOURS PRN
Qty: 12 TABLET | Refills: 0 | Status: SHIPPED | OUTPATIENT
Start: 2018-08-01

## 2018-08-01 RX ORDER — FENTANYL CITRATE 50 UG/ML
50 INJECTION, SOLUTION INTRAMUSCULAR; INTRAVENOUS
Status: DISCONTINUED | OUTPATIENT
Start: 2018-08-01 | End: 2018-08-01 | Stop reason: HOSPADM

## 2018-08-01 RX ORDER — SODIUM CHLORIDE 9 MG/ML
INJECTION, SOLUTION INTRAVENOUS AS NEEDED
Status: DISCONTINUED | OUTPATIENT
Start: 2018-08-01 | End: 2018-08-01 | Stop reason: HOSPADM

## 2018-08-01 RX ADMIN — FENTANYL CITRATE 50 MCG: 50 INJECTION INTRAMUSCULAR; INTRAVENOUS at 11:20

## 2018-08-01 RX ADMIN — FAMOTIDINE 20 MG: 10 INJECTION, SOLUTION INTRAVENOUS at 10:42

## 2018-08-01 RX ADMIN — ROCURONIUM BROMIDE 20 MG: 10 INJECTION INTRAVENOUS at 10:42

## 2018-08-01 RX ADMIN — ONDANSETRON 4 MG: 2 INJECTION, SOLUTION INTRAMUSCULAR; INTRAVENOUS at 10:42

## 2018-08-01 RX ADMIN — MIDAZOLAM HYDROCHLORIDE 2 MG: 1 INJECTION, SOLUTION INTRAMUSCULAR; INTRAVENOUS at 10:37

## 2018-08-01 RX ADMIN — FENTANYL CITRATE 50 MCG: 50 INJECTION INTRAMUSCULAR; INTRAVENOUS at 10:48

## 2018-08-01 RX ADMIN — SODIUM CHLORIDE, POTASSIUM CHLORIDE, SODIUM LACTATE AND CALCIUM CHLORIDE: 600; 310; 30; 20 INJECTION, SOLUTION INTRAVENOUS at 10:35

## 2018-08-01 RX ADMIN — DEXAMETHASONE SODIUM PHOSPHATE 8 MG: 10 INJECTION INTRAMUSCULAR; INTRAVENOUS at 10:42

## 2018-08-01 RX ADMIN — METOPROLOL TARTRATE 2 MG: 1 INJECTION, SOLUTION INTRAVENOUS at 11:30

## 2018-08-01 RX ADMIN — FENTANYL CITRATE 50 MCG: 50 INJECTION INTRAMUSCULAR; INTRAVENOUS at 11:59

## 2018-08-01 RX ADMIN — LIDOCAINE HYDROCHLORIDE 60 MG: 20 INJECTION, SOLUTION INFILTRATION; PERINEURAL at 10:37

## 2018-08-01 RX ADMIN — FENTANYL CITRATE 100 MCG: 50 INJECTION INTRAMUSCULAR; INTRAVENOUS at 10:37

## 2018-08-01 RX ADMIN — PROPOFOL 100 MG: 10 INJECTION, EMULSION INTRAVENOUS at 10:42

## 2018-08-01 RX ADMIN — ESMOLOL HYDROCHLORIDE 20 MG: 10 INJECTION, SOLUTION INTRAVENOUS at 11:34

## 2018-08-01 NOTE — ANESTHESIA PREPROCEDURE EVALUATION
Anesthesia Evaluation     no history of anesthetic complications:  NPO Solid Status: > 8 hours  NPO Liquid Status: > 8 hours           Airway   Mallampati: II  TM distance: >3 FB  Neck ROM: full  No difficulty expected  Dental    (+) poor dentition    Pulmonary - normal exam   (+) COPD, asthma,   Cardiovascular - normal exam    (+) hypertension,       Neuro/Psych  GI/Hepatic/Renal/Endo    (+)  GERD,  diabetes mellitus type 2,     Musculoskeletal     Abdominal  - normal exam   Substance History      OB/GYN          Other                        Anesthesia Plan    ASA 3     general     intravenous induction   Anesthetic plan and risks discussed with patient.

## 2018-08-01 NOTE — ANESTHESIA POSTPROCEDURE EVALUATION
Patient: Mei Stinson    Procedure Summary     Date:  08/01/18 Room / Location:  [facility] OR 09 /  [facility] OR    Anesthesia Start:  1039 Anesthesia Stop:  1138    Procedure:  Septoplasty, Elbert Endo MMA's, ethmoids, frontals (Bilateral Nose) Diagnosis:       Nasal septal deviation      Hypertrophy of inferior nasal turbinate      Acute recurrent ethmoidal sinusitis      Acute recurrent maxillary sinusitis      (Nasal septal deviation [J34.2])      (Hypertrophy of inferior nasal turbinate [J34.3])      (Acute recurrent ethmoidal sinusitis [J01.21])      (Acute recurrent maxillary sinusitis [J01.01])    Surgeon:  Alexander Whitifeld MD Provider:  Hector Feliciano MD    Anesthesia Type:  general ASA Status:  3          Anesthesia Type: general  Last vitals  BP   172/98 (08/01/18 1209)   Temp   98.3 °F (36.8 °C) (08/01/18 1209)   Pulse   80 (08/01/18 1214)   Resp   16 (08/01/18 1214)     SpO2   95 % (08/01/18 1214)     Post Anesthesia Care and Evaluation    Patient location during evaluation: PHASE II  Patient participation: complete - patient participated  Level of consciousness: awake and alert  Pain score: 1  Pain management: adequate  Airway patency: patent  Anesthetic complications: No anesthetic complications  PONV Status: controlled  Cardiovascular status: acceptable  Respiratory status: acceptable  Hydration status: acceptable

## 2018-08-01 NOTE — ANESTHESIA PROCEDURE NOTES
Airway  Urgency: elective    Date/Time: 8/1/2018 11:23 AM  Airway not difficult    General Information and Staff    Patient location during procedure: OR  Anesthesiologist: MANJU HOYT  CRNA: BRAULIO GÓMEZ    Indications and Patient Condition  Indications for airway management: airway protection    Preoxygenated: yes  MILS maintained throughout  Mask difficulty assessment: 1 - vent by mask    Final Airway Details  Final airway type: endotracheal airway      Successful airway: ETT  Cuffed: yes   Successful intubation technique: direct laryngoscopy  Endotracheal tube insertion site: oral  Blade: Morel  Blade size: #2  ETT size: 7.0 mm  Cormack-Lehane Classification: grade I - full view of glottis  Placement verified by: chest auscultation   Measured from: lips  ETT to lips (cm): 20  Number of attempts at approach: 1    Additional Comments  No trauma or change to dentition

## 2018-08-16 ENCOUNTER — APPOINTMENT (OUTPATIENT)
Dept: MAMMOGRAPHY | Facility: HOSPITAL | Age: 53
End: 2018-08-16

## 2018-08-16 ENCOUNTER — APPOINTMENT (OUTPATIENT)
Dept: BONE DENSITY | Facility: HOSPITAL | Age: 53
End: 2018-08-16

## 2018-09-27 ENCOUNTER — APPOINTMENT (OUTPATIENT)
Dept: MAMMOGRAPHY | Facility: HOSPITAL | Age: 53
End: 2018-09-27

## 2018-09-27 ENCOUNTER — APPOINTMENT (OUTPATIENT)
Dept: BONE DENSITY | Facility: HOSPITAL | Age: 53
End: 2018-09-27

## 2018-11-19 ENCOUNTER — HOSPITAL ENCOUNTER (OUTPATIENT)
Dept: MAMMOGRAPHY | Facility: HOSPITAL | Age: 53
Discharge: HOME | End: 2018-11-19
Admitting: NURSE PRACTITIONER

## 2018-11-19 ENCOUNTER — HOSPITAL ENCOUNTER (OUTPATIENT)
Dept: BONE DENSITY | Facility: HOSPITAL | Age: 53
Discharge: HOME | End: 2018-11-19

## 2018-11-19 DIAGNOSIS — M81.0 AGE-RELATED OSTEOPOROSIS WITHOUT CURRENT PATHOLOGICAL FRACTURE: ICD-10-CM

## 2018-11-19 DIAGNOSIS — Z12.39 SCREENING BREAST EXAMINATION: ICD-10-CM

## 2018-11-19 PROCEDURE — 77067 SCR MAMMO BI INCL CAD: CPT | Performed by: RADIOLOGY

## 2018-11-19 PROCEDURE — 77080 DXA BONE DENSITY AXIAL: CPT | Performed by: RADIOLOGY

## 2018-11-19 PROCEDURE — 77080 DXA BONE DENSITY AXIAL: CPT

## 2018-11-19 PROCEDURE — 77067 SCR MAMMO BI INCL CAD: CPT

## 2018-11-19 PROCEDURE — 77063 BREAST TOMOSYNTHESIS BI: CPT | Performed by: RADIOLOGY

## 2018-11-19 PROCEDURE — 77063 BREAST TOMOSYNTHESIS BI: CPT

## 2018-12-12 ENCOUNTER — APPOINTMENT (OUTPATIENT)
Dept: GENERAL RADIOLOGY | Facility: HOSPITAL | Age: 53
End: 2018-12-12

## 2018-12-12 ENCOUNTER — HOSPITAL ENCOUNTER (EMERGENCY)
Facility: HOSPITAL | Age: 53
Discharge: HOME | End: 2018-12-12
Attending: EMERGENCY MEDICINE | Admitting: EMERGENCY MEDICINE

## 2018-12-12 VITALS
WEIGHT: 180 LBS | HEART RATE: 60 BPM | RESPIRATION RATE: 16 BRPM | HEIGHT: 64 IN | DIASTOLIC BLOOD PRESSURE: 99 MMHG | TEMPERATURE: 97.5 F | BODY MASS INDEX: 30.73 KG/M2 | OXYGEN SATURATION: 96 % | SYSTOLIC BLOOD PRESSURE: 160 MMHG

## 2018-12-12 DIAGNOSIS — S52.501A CLOSED FRACTURE OF DISTAL END OF RIGHT RADIUS, UNSPECIFIED FRACTURE MORPHOLOGY, INITIAL ENCOUNTER: Primary | ICD-10-CM

## 2018-12-12 PROCEDURE — 96372 THER/PROPH/DIAG INJ SC/IM: CPT

## 2018-12-12 PROCEDURE — 25010000002 MORPHINE PER 10 MG: Performed by: EMERGENCY MEDICINE

## 2018-12-12 PROCEDURE — 73110 X-RAY EXAM OF WRIST: CPT | Performed by: RADIOLOGY

## 2018-12-12 PROCEDURE — 73110 X-RAY EXAM OF WRIST: CPT

## 2018-12-12 PROCEDURE — 99283 EMERGENCY DEPT VISIT LOW MDM: CPT

## 2018-12-12 RX ORDER — MORPHINE SULFATE 2 MG/ML
2 INJECTION, SOLUTION INTRAMUSCULAR; INTRAVENOUS ONCE
Status: COMPLETED | OUTPATIENT
Start: 2018-12-12 | End: 2018-12-12

## 2018-12-12 RX ORDER — MIRTAZAPINE 15 MG/1
15 TABLET, FILM COATED ORAL NIGHTLY
COMMUNITY

## 2018-12-12 RX ORDER — HYDROCODONE BITARTRATE AND ACETAMINOPHEN 5; 325 MG/1; MG/1
1 TABLET ORAL EVERY 6 HOURS PRN
Qty: 12 TABLET | Refills: 0 | Status: SHIPPED | OUTPATIENT
Start: 2018-12-12

## 2018-12-12 RX ORDER — VENLAFAXINE 75 MG/1
75 TABLET ORAL NIGHTLY
COMMUNITY

## 2018-12-12 RX ADMIN — MORPHINE SULFATE 2 MG: 2 INJECTION, SOLUTION INTRAMUSCULAR; INTRAVENOUS at 06:13

## 2018-12-12 NOTE — ED PROVIDER NOTES
Subjective     History provided by:  Patient   used: No    Wrist Injury   Location:  Wrist  Wrist location:  R wrist  Injury: yes    Time since incident:  30 minutes  Mechanism of injury comment:  Slipped on ice while walking into work   Associated symptoms: decreased range of motion and swelling        Review of Systems   Constitutional: Negative.    HENT: Negative.    Eyes: Negative.    Respiratory: Negative.    Cardiovascular: Negative.    Gastrointestinal: Negative.    Endocrine: Negative.    Genitourinary: Negative.    Musculoskeletal: Negative.    Skin: Negative.    Allergic/Immunologic: Negative.    Neurological: Negative.    Hematological: Negative.    Psychiatric/Behavioral: Negative.    All other systems reviewed and are negative.      Past Medical History:   Diagnosis Date   • Anxiety    • Arthritis    • Asthma    • Morin's esophagus determined by endoscopy    • Chronic bronchitis (CMS/HCC)    • COPD (chronic obstructive pulmonary disease) (CMS/HCC)    • Diabetes mellitus (CMS/HCC)    • Fibromyalgia    • GERD (gastroesophageal reflux disease)    • Hypertension        Allergies   Allergen Reactions   • Paxil [Paroxetine Hcl] Other (See Comments)     Liver damage   • Sulfa Antibiotics Rash       Past Surgical History:   Procedure Laterality Date   • ABDOMINAL SURGERY     • BLADDER SURGERY     • BREAST BIOPSY Right 2008   • CARDIAC CATHETERIZATION  2005    no findings   • CHOLECYSTECTOMY     • COLONOSCOPY     • ENDOSCOPY     • FOOT SURGERY     • HYSTERECTOMY     • TONSILLECTOMY     • TUBAL ABDOMINAL LIGATION         Family History   Problem Relation Age of Onset   • COPD Mother    • Emphysema Mother    • Heart failure Mother    • Alzheimer's disease Father    • Cancer Father    • Heart disease Sister    • Breast cancer Maternal Aunt        Social History     Socioeconomic History   • Marital status:      Spouse name: Not on file   • Number of children: Not on file   • Years of  education: Not on file   • Highest education level: Not on file   Tobacco Use   • Smoking status: Former Smoker     Types: Cigarettes     Last attempt to quit: 2016     Years since quittin.9   • Smokeless tobacco: Never Used   Substance and Sexual Activity   • Alcohol use: No   • Drug use: No   • Sexual activity: Defer           Objective   Physical Exam   Constitutional: She is oriented to person, place, and time. She appears well-developed and well-nourished.   HENT:   Head: Normocephalic and atraumatic.   Right Ear: External ear normal.   Left Ear: External ear normal.   Nose: Nose normal.   Mouth/Throat: Oropharynx is clear and moist.   Eyes: Conjunctivae and EOM are normal. Pupils are equal, round, and reactive to light.   Neck: Normal range of motion. Neck supple.   Cardiovascular: Normal rate, regular rhythm, normal heart sounds and intact distal pulses.   Pulmonary/Chest: Effort normal and breath sounds normal.   Abdominal: Soft. Bowel sounds are normal.   Musculoskeletal:        Right wrist: She exhibits decreased range of motion, tenderness and bony tenderness. She exhibits no swelling, no effusion, no crepitus, no deformity and no laceration.   Neurological: She is alert and oriented to person, place, and time.   Skin: Skin is warm and dry.   Nursing note and vitals reviewed.      Procedures           ED Course  ED Course as of Dec 12 0708   Wed Dec 12, 2018   0701 Radius fx per anayeli  XR Wrist 3+ View Right [ML]   0707 JOE [id number]    [ML]      ED Course User Index  [ML] Jannie Harrison PA                  [facility]      Final diagnoses:   Closed fracture of distal end of right radius, unspecified fracture morphology, initial encounter            Jannie Harrison PA  [phone]

## 2018-12-12 NOTE — ED NOTES
Short Arm splint applied on Right arm. Used  16 inches of 3 in Ortho glass. Wrapped with 2 3inch ace wraps.      Salma Morel  12/12/18 [id number]

## 2019-08-26 ENCOUNTER — TRANSCRIBE ORDERS (OUTPATIENT)
Dept: ADMINISTRATIVE | Facility: HOSPITAL | Age: 54
End: 2019-08-26

## 2019-08-26 DIAGNOSIS — I50.41 ACUTE COMBINED SYSTOLIC AND DIASTOLIC HEART FAILURE (HCC): Primary | ICD-10-CM

## 2019-09-11 ENCOUNTER — HOSPITAL ENCOUNTER (OUTPATIENT)
Dept: CARDIOLOGY | Facility: HOSPITAL | Age: 54
Discharge: HOME | End: 2019-09-11
Admitting: NURSE PRACTITIONER

## 2019-09-11 DIAGNOSIS — I50.41 ACUTE COMBINED SYSTOLIC AND DIASTOLIC HEART FAILURE (HCC): ICD-10-CM

## 2019-09-11 LAB
BH CV ECHO MEAS - AO MAX PG: 7.2 MMHG
BH CV ECHO MEAS - AO MEAN PG: 3.2 MMHG
BH CV ECHO MEAS - AO V2 MAX: 134.4 CM/SEC
BH CV ECHO MEAS - AO V2 MEAN: 81.9 CM/SEC
BH CV ECHO MEAS - AO V2 VTI: 23.1 CM
BH CV ECHO MEAS - BSA(HAYCOCK): 1.9 M^2
BH CV ECHO MEAS - BSA: 1.9 M^2
BH CV ECHO MEAS - BZI_BMI: 30.9 KILOGRAMS/M^2
BH CV ECHO MEAS - BZI_METRIC_HEIGHT: 162.6 CM
BH CV ECHO MEAS - BZI_METRIC_WEIGHT: 81.6 KG
BH CV ECHO MEAS - EDV(MOD-SP4): 51 ML
BH CV ECHO MEAS - EF(MOD-SP4): 62.7 %
BH CV ECHO MEAS - ESV(MOD-SP4): 19 ML
BH CV ECHO MEAS - LV DIASTOLIC VOL/BSA (35-75): 27.3 ML/M^2
BH CV ECHO MEAS - LV SYSTOLIC VOL/BSA (12-30): 10.2 ML/M^2
BH CV ECHO MEAS - LVLD AP4: 7.3 CM
BH CV ECHO MEAS - LVLS AP4: 6.3 CM
BH CV ECHO MEAS - MV A MAX VEL: 106.6 CM/SEC
BH CV ECHO MEAS - MV E MAX VEL: 74.5 CM/SEC
BH CV ECHO MEAS - MV E/A: 0.7
BH CV ECHO MEAS - SI(MOD-SP4): 17.1 ML/M^2
BH CV ECHO MEAS - SV(MOD-SP4): 32 ML
MAXIMAL PREDICTED HEART RATE: 165 BPM
STRESS TARGET HR: 140 BPM

## 2019-09-11 PROCEDURE — 93306 TTE W/DOPPLER COMPLETE: CPT

## 2019-10-03 ENCOUNTER — TRANSCRIBE ORDERS (OUTPATIENT)
Dept: ADMINISTRATIVE | Facility: HOSPITAL | Age: 54
End: 2019-10-03

## 2019-10-03 DIAGNOSIS — H60.63 CHRONIC OTITIS EXTERNA OF BOTH EARS, UNSPECIFIED TYPE: ICD-10-CM

## 2019-10-03 DIAGNOSIS — R42 DIZZINESS AND GIDDINESS: Primary | ICD-10-CM

## 2019-10-07 ENCOUNTER — APPOINTMENT (OUTPATIENT)
Dept: MRI IMAGING | Facility: HOSPITAL | Age: 54
End: 2019-10-07

## 2019-10-11 ENCOUNTER — HOSPITAL ENCOUNTER (OUTPATIENT)
Dept: MRI IMAGING | Facility: HOSPITAL | Age: 54
Discharge: HOME | End: 2019-10-11
Admitting: NURSE PRACTITIONER

## 2019-10-11 DIAGNOSIS — H60.63 CHRONIC OTITIS EXTERNA OF BOTH EARS, UNSPECIFIED TYPE: ICD-10-CM

## 2019-10-11 DIAGNOSIS — R42 DIZZINESS AND GIDDINESS: ICD-10-CM

## 2019-10-11 PROCEDURE — 70553 MRI BRAIN STEM W/O & W/DYE: CPT

## 2019-10-11 PROCEDURE — 70553 MRI BRAIN STEM W/O & W/DYE: CPT | Performed by: RADIOLOGY

## 2019-10-11 PROCEDURE — A9577 INJ MULTIHANCE: HCPCS | Performed by: NURSE PRACTITIONER

## 2019-10-11 PROCEDURE — 0 GADOBENATE DIMEGLUMINE 529 MG/ML SOLUTION: Performed by: NURSE PRACTITIONER

## 2019-10-11 RX ADMIN — GADOBENATE DIMEGLUMINE 16 ML: 529 INJECTION, SOLUTION INTRAVENOUS at 10:57

## 2020-04-29 ENCOUNTER — TRANSCRIBE ORDERS (OUTPATIENT)
Dept: ADMINISTRATIVE | Facility: HOSPITAL | Age: 55
End: 2020-04-29

## 2020-04-29 DIAGNOSIS — J96.11 CHRONIC RESPIRATORY FAILURE WITH HYPOXIA (HCC): Primary | ICD-10-CM

## 2020-04-29 DIAGNOSIS — J44.9 CHRONIC OBSTRUCTIVE PULMONARY DISEASE, UNSPECIFIED COPD TYPE (HCC): ICD-10-CM

## 2020-04-29 DIAGNOSIS — R06.02 SHORTNESS OF BREATH: ICD-10-CM

## 2020-04-29 DIAGNOSIS — J98.11 ATELECTASIS: ICD-10-CM

## 2020-05-12 ENCOUNTER — HOSPITAL ENCOUNTER (OUTPATIENT)
Dept: CT IMAGING | Facility: HOSPITAL | Age: 55
Discharge: HOME | End: 2020-05-12
Admitting: NURSE PRACTITIONER

## 2020-05-12 DIAGNOSIS — J96.11 CHRONIC RESPIRATORY FAILURE WITH HYPOXIA (HCC): ICD-10-CM

## 2020-05-12 DIAGNOSIS — R06.02 SHORTNESS OF BREATH: ICD-10-CM

## 2020-05-12 DIAGNOSIS — J98.11 ATELECTASIS: ICD-10-CM

## 2020-05-12 DIAGNOSIS — J44.9 CHRONIC OBSTRUCTIVE PULMONARY DISEASE, UNSPECIFIED COPD TYPE (HCC): ICD-10-CM

## 2020-05-12 LAB — CREAT BLDA-MCNC: 0.6 MG/DL (ref 0.6–1.3)

## 2020-05-12 PROCEDURE — 71260 CT THORAX DX C+: CPT | Performed by: RADIOLOGY

## 2020-05-12 PROCEDURE — 71270 CT THORAX DX C-/C+: CPT

## 2020-05-12 PROCEDURE — 0 IOVERSOL 68 % SOLUTION: Performed by: NURSE PRACTITIONER

## 2020-05-12 PROCEDURE — 82565 ASSAY OF CREATININE: CPT

## 2020-05-12 RX ADMIN — IOVERSOL 70 ML: 678 INJECTION INTRA-ARTERIAL; INTRAVENOUS at 10:50

## 2020-06-23 ENCOUNTER — APPOINTMENT (OUTPATIENT)
Dept: MAMMOGRAPHY | Facility: HOSPITAL | Age: 55
End: 2020-06-23

## 2021-03-18 ENCOUNTER — BULK ORDERING (OUTPATIENT)
Dept: CASE MANAGEMENT | Facility: OTHER | Age: 56
End: 2021-03-18

## 2021-03-18 DIAGNOSIS — Z23 IMMUNIZATION DUE: ICD-10-CM

## 2021-10-08 ENCOUNTER — HOSPITAL ENCOUNTER (OUTPATIENT)
Dept: HOSPITAL 79 - MAMO | Age: 56
End: 2021-10-08
Payer: COMMERCIAL

## 2021-10-08 DIAGNOSIS — Z12.31: Primary | ICD-10-CM

## 2022-06-06 ENCOUNTER — HOSPITAL ENCOUNTER (OUTPATIENT)
Dept: HOSPITAL 79 - EXRD | Age: 57
End: 2022-06-06
Attending: NURSE PRACTITIONER
Payer: COMMERCIAL

## 2022-06-06 DIAGNOSIS — E11.9: ICD-10-CM

## 2022-06-06 DIAGNOSIS — Z76.89: ICD-10-CM

## 2022-06-06 DIAGNOSIS — L94.0: Primary | ICD-10-CM

## 2022-06-06 DIAGNOSIS — E55.9: ICD-10-CM

## 2022-06-06 DIAGNOSIS — D64.9: ICD-10-CM

## 2022-06-06 DIAGNOSIS — E03.9: ICD-10-CM

## 2024-02-23 ENCOUNTER — HOSPITAL ENCOUNTER (OUTPATIENT)
Dept: GENERAL RADIOLOGY | Facility: HOSPITAL | Age: 59
Discharge: HOME | End: 2024-02-23
Payer: COMMERCIAL

## 2024-02-23 ENCOUNTER — TRANSCRIBE ORDERS (OUTPATIENT)
Dept: ADMINISTRATIVE | Facility: HOSPITAL | Age: 59
End: 2024-02-23
Payer: COMMERCIAL

## 2024-02-23 DIAGNOSIS — M54.2 CHRONIC NECK AND BACK PAIN: Primary | ICD-10-CM

## 2024-02-23 DIAGNOSIS — M54.9 CHRONIC NECK AND BACK PAIN: ICD-10-CM

## 2024-02-23 DIAGNOSIS — G89.29 CHRONIC NECK AND BACK PAIN: ICD-10-CM

## 2024-02-23 DIAGNOSIS — M54.2 CHRONIC NECK AND BACK PAIN: ICD-10-CM

## 2024-02-23 DIAGNOSIS — G89.29 CHRONIC NECK AND BACK PAIN: Primary | ICD-10-CM

## 2024-02-23 DIAGNOSIS — M54.9 CHRONIC NECK AND BACK PAIN: Primary | ICD-10-CM

## 2024-02-23 PROCEDURE — 72110 X-RAY EXAM L-2 SPINE 4/>VWS: CPT | Performed by: RADIOLOGY

## 2024-02-23 PROCEDURE — 72072 X-RAY EXAM THORAC SPINE 3VWS: CPT

## 2024-02-23 PROCEDURE — 72050 X-RAY EXAM NECK SPINE 4/5VWS: CPT

## 2024-02-23 PROCEDURE — 72110 X-RAY EXAM L-2 SPINE 4/>VWS: CPT

## 2024-02-23 PROCEDURE — 72050 X-RAY EXAM NECK SPINE 4/5VWS: CPT | Performed by: RADIOLOGY

## 2024-03-05 ENCOUNTER — TRANSCRIBE ORDERS (OUTPATIENT)
Dept: ADMINISTRATIVE | Facility: HOSPITAL | Age: 59
End: 2024-03-05
Payer: COMMERCIAL

## 2024-03-05 DIAGNOSIS — R41.3 MEMORY LOSS: Primary | ICD-10-CM

## 2024-03-15 ENCOUNTER — HOSPITAL ENCOUNTER (OUTPATIENT)
Dept: MRI IMAGING | Facility: HOSPITAL | Age: 59
Discharge: HOME | End: 2024-03-15
Payer: COMMERCIAL

## 2024-03-15 DIAGNOSIS — R41.3 MEMORY LOSS: ICD-10-CM

## 2024-03-15 PROCEDURE — 70551 MRI BRAIN STEM W/O DYE: CPT

## 2024-06-03 ENCOUNTER — HOSPITAL ENCOUNTER (OUTPATIENT)
Dept: GENERAL RADIOLOGY | Facility: HOSPITAL | Age: 59
Discharge: HOME | End: 2024-06-03
Payer: COMMERCIAL

## 2024-06-03 ENCOUNTER — TRANSCRIBE ORDERS (OUTPATIENT)
Dept: LAB | Facility: HOSPITAL | Age: 59
End: 2024-06-03
Payer: COMMERCIAL

## 2024-06-03 DIAGNOSIS — J44.9 CHRONIC OBSTRUCTIVE PULMONARY DISEASE, UNSPECIFIED COPD TYPE: Primary | ICD-10-CM

## 2024-06-03 DIAGNOSIS — J44.9 CHRONIC OBSTRUCTIVE PULMONARY DISEASE, UNSPECIFIED COPD TYPE: ICD-10-CM

## 2024-06-03 PROCEDURE — 71046 X-RAY EXAM CHEST 2 VIEWS: CPT

## 2024-06-03 PROCEDURE — 71046 X-RAY EXAM CHEST 2 VIEWS: CPT | Performed by: RADIOLOGY

## (undated) DEVICE — IRRIGATION TRAY WITH BULB SYRINGE: Brand: DOVER

## (undated) DEVICE — SPLINT 1524050 5PK PAIR DOYLE II AIRWAY: Brand: DOYLE II ™

## (undated) DEVICE — PK ENT 70

## (undated) DEVICE — ANTI-FOG SOLUTION WITH FOAM PAD: Brand: DEVON

## (undated) DEVICE — SPLINT 1524055 DOYLE II AIRWAY SET: Brand: DOYLE II ™

## (undated) DEVICE — FIRM 8CM: Brand: NASOPORE

## (undated) DEVICE — INTENDED FOR TISSUE SEPARATION, AND OTHER PROCEDURES THAT REQUIRE A SHARP SURGICAL BLADE TO PUNCTURE OR CUT.: Brand: BARD-PARKER ® STAINLESS STEEL BLADES

## (undated) DEVICE — BLADE 1884004HR TRICUT 5PK M4 4MM ROTATE: Brand: TRICUT

## (undated) DEVICE — SPNG GZ WOVN 4X4IN 12PLY 10/BX STRL

## (undated) DEVICE — HOLDER: Brand: DEROYAL

## (undated) DEVICE — SUT NLY 2/0 664G

## (undated) DEVICE — SUT GUT CHRM 4/0 FS2 27IN 635H